# Patient Record
Sex: FEMALE
[De-identification: names, ages, dates, MRNs, and addresses within clinical notes are randomized per-mention and may not be internally consistent; named-entity substitution may affect disease eponyms.]

---

## 2017-07-13 ENCOUNTER — HOSPITAL ENCOUNTER (EMERGENCY)
Dept: HOSPITAL 18 - NAV ERS | Age: 67
Discharge: HOME | End: 2017-07-13
Payer: MEDICARE

## 2017-07-13 DIAGNOSIS — M17.12: Primary | ICD-10-CM

## 2017-07-13 DIAGNOSIS — Z79.899: ICD-10-CM

## 2017-07-13 DIAGNOSIS — E10.9: ICD-10-CM

## 2017-07-13 DIAGNOSIS — Z76.0: ICD-10-CM

## 2017-07-13 DIAGNOSIS — F32.9: ICD-10-CM

## 2017-07-13 NOTE — RAD
FOUR VIEWS OF THE LEFT KNEE

7/13/17

 

COMPARISON:  

None.

 

HISTORY: 

Knee pain and swelling.

 

FINDINGS:  

There is medial compartment narrowing with chondrocalcinosis and osteophyte formation, moderate in s
everity. There is mild lateral compartment narrowing with associated osteophyte formation and chondr
ocalcinosis. There is patellofemoral joint space narrowing and osteophyte formation, moderate in sev
erity. There is a small associated knee joint effusion. No displaced fracture or dislocation is seen
. 

 

IMPRESSION:  

Multicompartment degenerative change. No acute osseous abnormality. 

 

POS: [facility]

## 2017-07-28 ENCOUNTER — HOSPITAL ENCOUNTER (EMERGENCY)
Dept: HOSPITAL 18 - NAV ERS | Age: 67
Discharge: HOME | End: 2017-07-28
Payer: MEDICARE

## 2017-07-28 DIAGNOSIS — Z79.4: ICD-10-CM

## 2017-07-28 DIAGNOSIS — F32.9: ICD-10-CM

## 2017-07-28 DIAGNOSIS — L03.011: Primary | ICD-10-CM

## 2017-07-28 DIAGNOSIS — M17.12: ICD-10-CM

## 2017-07-28 DIAGNOSIS — E10.9: ICD-10-CM

## 2017-07-28 DIAGNOSIS — Z79.899: ICD-10-CM

## 2017-07-28 PROCEDURE — 99283 EMERGENCY DEPT VISIT LOW MDM: CPT

## 2017-07-30 ENCOUNTER — HOSPITAL ENCOUNTER (EMERGENCY)
Dept: HOSPITAL 18 - NAV ERS | Age: 67
LOS: 1 days | Discharge: HOME | End: 2017-07-31
Payer: MEDICARE

## 2017-07-30 DIAGNOSIS — Z79.4: ICD-10-CM

## 2017-07-30 DIAGNOSIS — F32.9: ICD-10-CM

## 2017-07-30 DIAGNOSIS — M19.90: ICD-10-CM

## 2017-07-30 DIAGNOSIS — E10.65: ICD-10-CM

## 2017-07-30 DIAGNOSIS — M62.838: Primary | ICD-10-CM

## 2017-07-30 DIAGNOSIS — Z86.73: ICD-10-CM

## 2017-07-30 PROCEDURE — 99283 EMERGENCY DEPT VISIT LOW MDM: CPT

## 2017-07-30 PROCEDURE — 36416 COLLJ CAPILLARY BLOOD SPEC: CPT

## 2017-08-21 ENCOUNTER — HOSPITAL ENCOUNTER (EMERGENCY)
Dept: HOSPITAL 18 - NAV ERS | Age: 67
Discharge: HOME | End: 2017-08-21
Payer: MEDICARE

## 2017-08-21 DIAGNOSIS — E11.65: ICD-10-CM

## 2017-08-21 DIAGNOSIS — S70.362A: Primary | ICD-10-CM

## 2017-08-21 DIAGNOSIS — M19.90: ICD-10-CM

## 2017-08-21 DIAGNOSIS — S70.261A: ICD-10-CM

## 2017-08-21 DIAGNOSIS — L08.9: ICD-10-CM
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
## 2017-08-21 DIAGNOSIS — F32.9: ICD-10-CM

## 2017-08-21 DIAGNOSIS — Z79.4: ICD-10-CM

## 2017-08-21 DIAGNOSIS — Z79.899: ICD-10-CM

## 2017-08-21 PROCEDURE — 36416 COLLJ CAPILLARY BLOOD SPEC: CPT

## 2017-08-21 PROCEDURE — 99282 EMERGENCY DEPT VISIT SF MDM: CPT

## 2018-03-19 ENCOUNTER — HOSPITAL ENCOUNTER (EMERGENCY)
Dept: HOSPITAL 18 - NAV ERS | Age: 68
Discharge: HOME | End: 2018-03-19
Payer: MEDICARE

## 2018-03-19 DIAGNOSIS — E10.9: ICD-10-CM

## 2018-03-19 DIAGNOSIS — F32.9: ICD-10-CM

## 2018-03-19 DIAGNOSIS — M19.90: ICD-10-CM

## 2018-03-19 DIAGNOSIS — Z79.899: ICD-10-CM

## 2018-03-19 DIAGNOSIS — Z79.4: ICD-10-CM

## 2018-03-19 DIAGNOSIS — K61.0: Primary | ICD-10-CM

## 2018-03-19 DIAGNOSIS — Z86.73: ICD-10-CM

## 2018-03-19 PROCEDURE — 87205 SMEAR GRAM STAIN: CPT

## 2018-03-19 PROCEDURE — 10060 I&D ABSCESS SIMPLE/SINGLE: CPT

## 2018-03-19 PROCEDURE — 36416 COLLJ CAPILLARY BLOOD SPEC: CPT

## 2018-03-19 PROCEDURE — 87186 SC STD MICRODIL/AGAR DIL: CPT

## 2018-03-19 PROCEDURE — 87070 CULTURE OTHR SPECIMN AEROBIC: CPT

## 2018-03-19 PROCEDURE — 87077 CULTURE AEROBIC IDENTIFY: CPT

## 2021-09-16 ENCOUNTER — HOSPITAL ENCOUNTER (EMERGENCY)
Dept: HOSPITAL 18 - NAV ERS | Age: 71
Discharge: LEFT BEFORE DISCHARGE | End: 2021-09-16
Payer: COMMERCIAL

## 2021-09-16 DIAGNOSIS — S30.1XXA: ICD-10-CM

## 2021-09-16 DIAGNOSIS — S80.01XA: ICD-10-CM
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
## 2021-09-16 DIAGNOSIS — S52.042A: ICD-10-CM

## 2021-09-16 DIAGNOSIS — Z79.4: ICD-10-CM

## 2021-09-16 DIAGNOSIS — E10.9: ICD-10-CM

## 2021-09-16 DIAGNOSIS — K80.80: ICD-10-CM

## 2021-09-16 DIAGNOSIS — S20.212A: ICD-10-CM

## 2021-09-16 DIAGNOSIS — S52.502A: Primary | ICD-10-CM

## 2021-09-16 LAB
ALBUMIN SERPL BCG-MCNC: 3.9 G/DL (ref 3.4–4.8)
ALP SERPL-CCNC: 111 U/L (ref 40–110)
ALT SERPL W P-5'-P-CCNC: 31 U/L (ref 8–55)
ANION GAP SERPL CALC-SCNC: 18 MMOL/L (ref 10–20)
AST SERPL-CCNC: 45 U/L (ref 5–34)
BASOPHILS # BLD AUTO: 0.1 THOU/UL (ref 0–0.2)
BASOPHILS NFR BLD AUTO: 0.6 % (ref 0–1)
BILIRUB SERPL-MCNC: 0.8 MG/DL (ref 0.2–1.2)
BUN SERPL-MCNC: 17 MG/DL (ref 9.8–20.1)
CALCIUM SERPL-MCNC: 9.3 MG/DL (ref 7.8–10.44)
CHLORIDE SERPL-SCNC: 99 MMOL/L (ref 98–107)
CO2 SERPL-SCNC: 20 MMOL/L (ref 23–31)
CREAT CL PREDICTED SERPL C-G-VRATE: 0 ML/MIN (ref 70–130)
EOSINOPHIL # BLD AUTO: 0 THOU/UL (ref 0–0.7)
EOSINOPHIL NFR BLD AUTO: 0.1 % (ref 0–10)
GLOBULIN SER CALC-MCNC: 3.5 G/DL (ref 2.4–3.5)
GLUCOSE SERPL-MCNC: 406 MG/DL (ref 80–115)
HGB BLD-MCNC: 13.8 G/DL (ref 12–16)
LIPASE SERPL-CCNC: 29 U/L (ref 8–78)
LYMPHOCYTES # BLD AUTO: 1.2 THOU/UL (ref 1.2–3.4)
LYMPHOCYTES NFR BLD AUTO: 12.6 % (ref 21–51)
MCH RBC QN AUTO: 29.6 PG (ref 27–31)
MCV RBC AUTO: 93.4 FL (ref 78–98)
MONOCYTES # BLD AUTO: 0.6 THOU/UL (ref 0.11–0.59)
MONOCYTES NFR BLD AUTO: 5.5 % (ref 0–10)
NEUTROPHILS # BLD AUTO: 8 THOU/UL (ref 1.4–6.5)
NEUTROPHILS NFR BLD AUTO: 81.1 % (ref 42–75)
PLATELET # BLD AUTO: 266 THOU/UL (ref 130–400)
POTASSIUM SERPL-SCNC: 4.7 MMOL/L (ref 3.5–5.1)
RBC # BLD AUTO: 4.65 MILL/UL (ref 4.2–5.4)
SODIUM SERPL-SCNC: 132 MMOL/L (ref 136–145)
WBC # BLD AUTO: 9.9 THOU/UL (ref 4.8–10.8)

## 2021-09-16 PROCEDURE — 85025 COMPLETE CBC W/AUTO DIFF WBC: CPT

## 2021-09-16 PROCEDURE — 84484 ASSAY OF TROPONIN QUANT: CPT

## 2021-09-16 PROCEDURE — 93005 ELECTROCARDIOGRAM TRACING: CPT

## 2021-09-16 PROCEDURE — 29105 APPLICATION LONG ARM SPLINT: CPT

## 2021-09-16 PROCEDURE — 71260 CT THORAX DX C+: CPT

## 2021-09-16 PROCEDURE — 74177 CT ABD & PELVIS W/CONTRAST: CPT

## 2021-09-16 PROCEDURE — 83690 ASSAY OF LIPASE: CPT

## 2021-09-16 PROCEDURE — 72125 CT NECK SPINE W/O DYE: CPT

## 2021-09-16 PROCEDURE — 80053 COMPREHEN METABOLIC PANEL: CPT

## 2021-09-18 ENCOUNTER — HOSPITAL ENCOUNTER (EMERGENCY)
Dept: HOSPITAL 18 - NAV ERS | Age: 71
LOS: 1 days | Discharge: LEFT BEFORE DISCHARGE | End: 2021-09-19
Payer: COMMERCIAL

## 2021-09-18 DIAGNOSIS — S70.02XA: Primary | ICD-10-CM

## 2021-09-18 DIAGNOSIS — S30.1XXA: ICD-10-CM
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
## 2021-09-18 DIAGNOSIS — E10.9: ICD-10-CM

## 2021-09-18 LAB
ALBUMIN SERPL BCG-MCNC: 3.3 G/DL (ref 3.4–4.8)
ALP SERPL-CCNC: 84 U/L (ref 40–110)
ALT SERPL W P-5'-P-CCNC: 21 U/L (ref 8–55)
ANION GAP SERPL CALC-SCNC: 14 MMOL/L (ref 10–20)
AST SERPL-CCNC: 18 U/L (ref 5–34)
BASOPHILS # BLD AUTO: 0.1 THOU/UL (ref 0–0.2)
BASOPHILS NFR BLD AUTO: 0.7 % (ref 0–1)
BILIRUB SERPL-MCNC: 0.5 MG/DL (ref 0.2–1.2)
BUN SERPL-MCNC: 16 MG/DL (ref 9.8–20.1)
CALCIUM SERPL-MCNC: 9 MG/DL (ref 7.8–10.44)
CHLORIDE SERPL-SCNC: 104 MMOL/L (ref 98–107)
CO2 SERPL-SCNC: 22 MMOL/L (ref 23–31)
CREAT CL PREDICTED SERPL C-G-VRATE: 0 ML/MIN (ref 70–130)
EOSINOPHIL # BLD AUTO: 0.1 THOU/UL (ref 0–0.7)
EOSINOPHIL NFR BLD AUTO: 0.8 % (ref 0–10)
GLOBULIN SER CALC-MCNC: 2.8 G/DL (ref 2.4–3.5)
GLUCOSE SERPL-MCNC: 308 MG/DL (ref 80–115)
HGB BLD-MCNC: 12.5 G/DL (ref 12–16)
LYMPHOCYTES # BLD AUTO: 1.6 THOU/UL (ref 1.2–3.4)
LYMPHOCYTES NFR BLD AUTO: 19 % (ref 21–51)
MCH RBC QN AUTO: 29.9 PG (ref 27–31)
MCV RBC AUTO: 93.6 FL (ref 78–98)
MONOCYTES # BLD AUTO: 0.6 THOU/UL (ref 0.11–0.59)
MONOCYTES NFR BLD AUTO: 6.8 % (ref 0–10)
NEUTROPHILS # BLD AUTO: 6 THOU/UL (ref 1.4–6.5)
NEUTROPHILS NFR BLD AUTO: 72.7 % (ref 42–75)
PLATELET # BLD AUTO: 240 THOU/UL (ref 130–400)
POTASSIUM SERPL-SCNC: 3.7 MMOL/L (ref 3.5–5.1)
RBC # BLD AUTO: 4.17 MILL/UL (ref 4.2–5.4)
SODIUM SERPL-SCNC: 136 MMOL/L (ref 136–145)
SP GR UR STRIP: 1.02 (ref 1–1.03)
WBC # BLD AUTO: 8.2 THOU/UL (ref 4.8–10.8)

## 2021-09-18 PROCEDURE — 85025 COMPLETE CBC W/AUTO DIFF WBC: CPT

## 2021-09-18 PROCEDURE — 83605 ASSAY OF LACTIC ACID: CPT

## 2021-09-18 PROCEDURE — 81003 URINALYSIS AUTO W/O SCOPE: CPT

## 2021-09-18 PROCEDURE — 80053 COMPREHEN METABOLIC PANEL: CPT

## 2021-09-18 PROCEDURE — 71045 X-RAY EXAM CHEST 1 VIEW: CPT

## 2021-09-18 PROCEDURE — 36415 COLL VENOUS BLD VENIPUNCTURE: CPT

## 2022-08-21 ENCOUNTER — HOSPITAL ENCOUNTER (EMERGENCY)
Dept: HOSPITAL 18 - NAV ERS | Age: 72
Discharge: HOME | End: 2022-08-21
Payer: MEDICARE

## 2022-08-21 DIAGNOSIS — Z79.899: ICD-10-CM

## 2022-08-21 DIAGNOSIS — N30.00: Primary | ICD-10-CM

## 2022-08-21 DIAGNOSIS — E10.9: ICD-10-CM

## 2022-08-21 LAB
BACTERIA UR QL AUTO: (no result) HPF
GLUCOSE UR STRIP-MCNC: >=1000 MG/DL
RBC UR QL AUTO: (no result) HPF (ref 0–3)
SP GR UR STRIP: 1.03 (ref 1–1.04)
WBC UR QL AUTO: (no result) HPF (ref 0–3)

## 2022-08-21 PROCEDURE — 87086 URINE CULTURE/COLONY COUNT: CPT

## 2022-08-21 PROCEDURE — 87186 SC STD MICRODIL/AGAR DIL: CPT

## 2022-08-21 PROCEDURE — 99283 EMERGENCY DEPT VISIT LOW MDM: CPT

## 2022-08-21 PROCEDURE — 96372 THER/PROPH/DIAG INJ SC/IM: CPT

## 2022-08-21 PROCEDURE — 87077 CULTURE AEROBIC IDENTIFY: CPT

## 2022-08-21 PROCEDURE — 81003 URINALYSIS AUTO W/O SCOPE: CPT

## 2022-08-21 PROCEDURE — 36416 COLLJ CAPILLARY BLOOD SPEC: CPT

## 2022-08-21 PROCEDURE — 81015 MICROSCOPIC EXAM OF URINE: CPT

## 2022-09-18 ENCOUNTER — HOSPITAL ENCOUNTER (EMERGENCY)
Dept: HOSPITAL 18 - NAV ERS | Age: 72
Discharge: HOME | End: 2022-09-18
Payer: COMMERCIAL

## 2022-09-18 DIAGNOSIS — M19.012: ICD-10-CM

## 2022-09-18 DIAGNOSIS — Z79.4: ICD-10-CM

## 2022-09-18 DIAGNOSIS — E10.9: ICD-10-CM

## 2022-09-18 DIAGNOSIS — M16.11: Primary | ICD-10-CM

## 2022-09-18 PROCEDURE — 96372 THER/PROPH/DIAG INJ SC/IM: CPT

## 2022-10-06 ENCOUNTER — HOSPITAL ENCOUNTER (EMERGENCY)
Dept: HOSPITAL 18 - NAV ERS | Age: 72
Discharge: HOME | End: 2022-10-06
Payer: COMMERCIAL

## 2022-10-06 DIAGNOSIS — M19.90: ICD-10-CM

## 2022-10-06 DIAGNOSIS — Z86.73: ICD-10-CM

## 2022-10-06 DIAGNOSIS — R35.0: ICD-10-CM

## 2022-10-06 DIAGNOSIS — Z79.82: ICD-10-CM

## 2022-10-06 DIAGNOSIS — J02.9: Primary | ICD-10-CM

## 2022-10-06 DIAGNOSIS — Z79.899: ICD-10-CM

## 2022-10-06 DIAGNOSIS — E10.9: ICD-10-CM

## 2022-10-06 LAB
GLUCOSE UR STRIP-MCNC: >=1000 MG/DL
SP GR UR STRIP: 1.01 (ref 1–1.03)

## 2022-10-06 PROCEDURE — 99283 EMERGENCY DEPT VISIT LOW MDM: CPT

## 2022-10-06 PROCEDURE — 81003 URINALYSIS AUTO W/O SCOPE: CPT

## 2022-11-05 ENCOUNTER — HOSPITAL ENCOUNTER (EMERGENCY)
Dept: HOSPITAL 18 - NAV ERS | Age: 72
Discharge: HOME | End: 2022-11-05
Payer: COMMERCIAL

## 2022-11-05 DIAGNOSIS — N12: Primary | ICD-10-CM

## 2022-11-05 DIAGNOSIS — B37.31: ICD-10-CM

## 2022-11-05 DIAGNOSIS — Z79.84: ICD-10-CM

## 2022-11-05 DIAGNOSIS — N39.0: ICD-10-CM

## 2022-11-05 DIAGNOSIS — Z79.4: ICD-10-CM

## 2022-11-05 DIAGNOSIS — Z79.899: ICD-10-CM

## 2022-11-05 DIAGNOSIS — E10.9: ICD-10-CM

## 2022-11-05 DIAGNOSIS — M79.661: ICD-10-CM

## 2022-11-05 LAB
ALBUMIN SERPL BCG-MCNC: 3.8 G/DL (ref 3.4–4.8)
ALP SERPL-CCNC: 124 U/L (ref 40–110)
ALT SERPL W P-5'-P-CCNC: 18 U/L (ref 8–55)
ANION GAP SERPL CALC-SCNC: 12 MMOL/L (ref 10–20)
AST SERPL-CCNC: 13 U/L (ref 5–34)
BACTERIA UR QL AUTO: (no result) HPF
BASOPHILS # BLD AUTO: 0 THOU/UL (ref 0–0.2)
BASOPHILS NFR BLD AUTO: 0.5 % (ref 0–1)
BILIRUB SERPL-MCNC: 0.4 MG/DL (ref 0.2–1.2)
BUN SERPL-MCNC: 15 MG/DL (ref 9.8–20.1)
CALCIUM SERPL-MCNC: 9.1 MG/DL (ref 7.8–10.44)
CHLORIDE SERPL-SCNC: 106 MMOL/L (ref 98–107)
CK SERPL-CCNC: 107 U/L (ref 29–168)
CO2 SERPL-SCNC: 27 MMOL/L (ref 23–31)
CREAT CL PREDICTED SERPL C-G-VRATE: 0 ML/MIN (ref 70–130)
EOSINOPHIL # BLD AUTO: 0.1 THOU/UL (ref 0–0.7)
EOSINOPHIL NFR BLD AUTO: 1.5 % (ref 0–10)
GLOBULIN SER CALC-MCNC: 2.8 G/DL (ref 2.4–3.5)
GLUCOSE SERPL-MCNC: 233 MG/DL (ref 83–110)
GLUCOSE UR STRIP-MCNC: 100 MG/DL
HGB BLD-MCNC: 13.3 G/DL (ref 12–16)
LIPASE SERPL-CCNC: 21 U/L (ref 8–78)
LYMPHOCYTES # BLD AUTO: 1.8 THOU/UL (ref 1.2–3.4)
LYMPHOCYTES NFR BLD AUTO: 26 % (ref 21–51)
MCH RBC QN AUTO: 30.3 PG (ref 27–31)
MCV RBC AUTO: 92.3 FL (ref 78–98)
MONOCYTES # BLD AUTO: 0.5 THOU/UL (ref 0.11–0.59)
MONOCYTES NFR BLD AUTO: 7 % (ref 0–10)
NEUTROPHILS # BLD AUTO: 4.4 THOU/UL (ref 1.4–6.5)
NEUTROPHILS NFR BLD AUTO: 65 % (ref 42–75)
PLATELET # BLD AUTO: 282 THOU/UL (ref 130–400)
POTASSIUM SERPL-SCNC: 4 MMOL/L (ref 3.5–5.1)
RBC # BLD AUTO: 4.38 MILL/UL (ref 4.2–5.4)
RBC UR QL AUTO: (no result) HPF (ref 0–3)
SODIUM SERPL-SCNC: 141 MMOL/L (ref 136–145)
SP GR UR STRIP: 1.01 (ref 1–1.03)
WBC # BLD AUTO: 6.8 THOU/UL (ref 4.8–10.8)
WBC UR QL AUTO: (no result) HPF (ref 0–3)
YEAST # UR AUTO: (no result) HPF

## 2022-11-05 PROCEDURE — 85379 FIBRIN DEGRADATION QUANT: CPT

## 2022-11-05 PROCEDURE — 85025 COMPLETE CBC W/AUTO DIFF WBC: CPT

## 2022-11-05 PROCEDURE — 96365 THER/PROPH/DIAG IV INF INIT: CPT

## 2022-11-05 PROCEDURE — 83690 ASSAY OF LIPASE: CPT

## 2022-11-05 PROCEDURE — 93005 ELECTROCARDIOGRAM TRACING: CPT

## 2022-11-05 PROCEDURE — 87077 CULTURE AEROBIC IDENTIFY: CPT

## 2022-11-05 PROCEDURE — 74177 CT ABD & PELVIS W/CONTRAST: CPT

## 2022-11-05 PROCEDURE — 71045 X-RAY EXAM CHEST 1 VIEW: CPT

## 2022-11-05 PROCEDURE — 80053 COMPREHEN METABOLIC PANEL: CPT

## 2022-11-05 PROCEDURE — 81003 URINALYSIS AUTO W/O SCOPE: CPT

## 2022-11-05 PROCEDURE — 96375 TX/PRO/DX INJ NEW DRUG ADDON: CPT

## 2022-11-05 PROCEDURE — 82550 ASSAY OF CK (CPK): CPT

## 2022-11-05 PROCEDURE — 83880 ASSAY OF NATRIURETIC PEPTIDE: CPT

## 2022-11-05 PROCEDURE — 81015 MICROSCOPIC EXAM OF URINE: CPT

## 2022-11-05 PROCEDURE — 87086 URINE CULTURE/COLONY COUNT: CPT

## 2023-03-05 ENCOUNTER — HOSPITAL ENCOUNTER (EMERGENCY)
Dept: HOSPITAL 18 - NAV ERS | Age: 73
Discharge: HOME | End: 2023-03-05
Payer: COMMERCIAL

## 2023-03-05 DIAGNOSIS — Z79.4: ICD-10-CM

## 2023-03-05 DIAGNOSIS — R60.9: ICD-10-CM

## 2023-03-05 DIAGNOSIS — I10: ICD-10-CM

## 2023-03-05 DIAGNOSIS — M16.11: ICD-10-CM

## 2023-03-05 DIAGNOSIS — E11.9: ICD-10-CM

## 2023-03-05 DIAGNOSIS — E78.00: ICD-10-CM

## 2023-03-05 DIAGNOSIS — R39.15: Primary | ICD-10-CM

## 2023-03-05 LAB
GLUCOSE UR STRIP-MCNC: 500 MG/DL
SP GR UR STRIP: 1.02 (ref 1–1.03)

## 2023-03-05 PROCEDURE — 81003 URINALYSIS AUTO W/O SCOPE: CPT

## 2023-03-28 ENCOUNTER — HOSPITAL ENCOUNTER (EMERGENCY)
Dept: HOSPITAL 18 - NAV ERS | Age: 73
LOS: 1 days | Discharge: LEFT BEFORE DISCHARGE | End: 2023-03-29
Payer: COMMERCIAL

## 2023-03-28 DIAGNOSIS — I10: ICD-10-CM

## 2023-03-28 DIAGNOSIS — E11.40: ICD-10-CM

## 2023-03-28 DIAGNOSIS — R79.89: ICD-10-CM

## 2023-03-28 DIAGNOSIS — R60.0: Primary | ICD-10-CM

## 2023-03-28 DIAGNOSIS — Z79.4: ICD-10-CM

## 2023-03-28 DIAGNOSIS — E78.5: ICD-10-CM

## 2023-03-28 PROCEDURE — 85025 COMPLETE CBC W/AUTO DIFF WBC: CPT

## 2023-03-28 PROCEDURE — 83880 ASSAY OF NATRIURETIC PEPTIDE: CPT

## 2023-03-28 PROCEDURE — 84443 ASSAY THYROID STIM HORMONE: CPT

## 2023-03-28 PROCEDURE — 99284 EMERGENCY DEPT VISIT MOD MDM: CPT

## 2023-03-28 PROCEDURE — 83605 ASSAY OF LACTIC ACID: CPT

## 2023-03-28 PROCEDURE — 80053 COMPREHEN METABOLIC PANEL: CPT

## 2023-03-28 PROCEDURE — 36415 COLL VENOUS BLD VENIPUNCTURE: CPT

## 2023-03-28 PROCEDURE — 85379 FIBRIN DEGRADATION QUANT: CPT

## 2023-03-29 LAB
ALBUMIN SERPL BCG-MCNC: 3.8 G/DL (ref 3.4–4.8)
ALP SERPL-CCNC: 131 U/L (ref 40–110)
ALT SERPL W P-5'-P-CCNC: 16 U/L (ref 8–55)
ANION GAP SERPL CALC-SCNC: 14 MMOL/L (ref 10–20)
AST SERPL-CCNC: 14 U/L (ref 5–34)
BASOPHILS # BLD AUTO: 0.1 THOU/UL (ref 0–0.2)
BASOPHILS NFR BLD AUTO: 1 % (ref 0–1)
BILIRUB SERPL-MCNC: 0.2 MG/DL (ref 0.2–1.2)
BUN SERPL-MCNC: 20 MG/DL (ref 9.8–20.1)
CALCIUM SERPL-MCNC: 9.5 MG/DL (ref 7.8–10.44)
CHLORIDE SERPL-SCNC: 105 MMOL/L (ref 98–107)
CO2 SERPL-SCNC: 27 MMOL/L (ref 23–31)
CREAT CL PREDICTED SERPL C-G-VRATE: 0 ML/MIN (ref 70–130)
EOSINOPHIL # BLD AUTO: 0.2 THOU/UL (ref 0–0.7)
EOSINOPHIL NFR BLD AUTO: 3.2 % (ref 0–10)
GLOBULIN SER CALC-MCNC: 2.8 G/DL (ref 2.4–3.5)
GLUCOSE SERPL-MCNC: 210 MG/DL (ref 83–110)
HGB BLD-MCNC: 12.3 G/DL (ref 12–16)
LYMPHOCYTES # BLD AUTO: 2.7 THOU/UL (ref 1.2–3.4)
LYMPHOCYTES NFR BLD AUTO: 41.6 % (ref 21–51)
MCH RBC QN AUTO: 29.7 PG (ref 27–31)
MCV RBC AUTO: 91.6 FL (ref 78–98)
MONOCYTES # BLD AUTO: 0.5 THOU/UL (ref 0.11–0.59)
MONOCYTES NFR BLD AUTO: 7.3 % (ref 0–10)
NEUTROPHILS # BLD AUTO: 3 THOU/UL (ref 1.4–6.5)
NEUTROPHILS NFR BLD AUTO: 46.9 % (ref 42–75)
PLATELET # BLD AUTO: 262 10X3/UL (ref 130–400)
POTASSIUM SERPL-SCNC: 4.1 MMOL/L (ref 3.5–5.1)
RBC # BLD AUTO: 4.15 MILL/UL (ref 4.2–5.4)
SODIUM SERPL-SCNC: 142 MMOL/L (ref 136–145)
WBC # BLD AUTO: 6.5 10X3/UL (ref 4.8–10.8)